# Patient Record
(demographics unavailable — no encounter records)

---

## 2025-03-12 NOTE — HISTORY OF PRESENT ILLNESS
[FreeTextEntry1] : 34 yo presents for vaginal itching and burning after intercourse. Reports she was recently sexually active with a new partner after her surgery when symptoms started.   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 2/2022 she had a laparoscopic hysterectomy BS and upper vaginectomy.

## 2025-03-12 NOTE — DISCUSSION/SUMMARY
[FreeTextEntry1] : 36 yo for vaginitis -rx given terconazole, discussed possibility of needing supression -f/u STI screening

## 2025-03-12 NOTE — PHYSICAL EXAM
[Chaperone Present] : A chaperone was present in the examining room during all aspects of the physical examination [FreeTextEntry2] : eRgla [Appropriately responsive] : appropriately responsive [Labia Majora] : normal [Labia Minora] : normal [Normal] : normal [Uterine Adnexae] : normal